# Patient Record
Sex: MALE | Race: WHITE | NOT HISPANIC OR LATINO | Employment: FULL TIME | URBAN - METROPOLITAN AREA
[De-identification: names, ages, dates, MRNs, and addresses within clinical notes are randomized per-mention and may not be internally consistent; named-entity substitution may affect disease eponyms.]

---

## 2018-08-16 ENCOUNTER — OFFICE VISIT CONVERTED (OUTPATIENT)
Dept: SURGERY | Facility: CLINIC | Age: 43
End: 2018-08-16
Attending: SURGERY

## 2018-10-02 ENCOUNTER — OFFICE VISIT (OUTPATIENT)
Dept: SURGERY | Facility: CLINIC | Age: 43
End: 2018-10-02

## 2018-10-02 VITALS — OXYGEN SATURATION: 98 % | WEIGHT: 255 LBS | HEIGHT: 65 IN | HEART RATE: 83 BPM | BODY MASS INDEX: 42.49 KG/M2

## 2018-10-02 DIAGNOSIS — K42.9 UMBILICAL HERNIA WITHOUT OBSTRUCTION AND WITHOUT GANGRENE: Primary | ICD-10-CM

## 2018-10-02 PROCEDURE — 99243 OFF/OP CNSLTJ NEW/EST LOW 30: CPT | Performed by: SURGERY

## 2018-10-02 RX ORDER — CHLORAL HYDRATE 500 MG
CAPSULE ORAL
Refills: 0 | COMMUNITY
Start: 2018-08-13

## 2018-10-02 RX ORDER — ASPIRIN 81 MG
TABLET, DELAYED RELEASE (ENTERIC COATED) ORAL DAILY
Refills: 0 | COMMUNITY
Start: 2018-08-10 | End: 2022-11-05

## 2018-10-02 RX ORDER — LISINOPRIL AND HYDROCHLOROTHIAZIDE 25; 20 MG/1; MG/1
TABLET ORAL DAILY
Refills: 0 | COMMUNITY
Start: 2018-08-10 | End: 2021-07-16 | Stop reason: SDUPTHER

## 2018-10-02 NOTE — PROGRESS NOTES
Subjective   Robert Chau is a 42 y.o. male who presents to the office in surgical consultation from Paul Naylor MD for an umbilical hernia.    History of Present Illness     The patient recently noticed a bulge at the umbilicus that is slowly getting larger.  He does not have symptoms of pain unless pressure is applied.  He does a lot of heavy lifting and is concerned that he is at risk for incarceration or strangulation.  He has not had any change in his bowel or bladder function.    Review of Systems   Constitutional: Negative for activity change, appetite change, fatigue and fever.   HENT: Negative for trouble swallowing and voice change.    Respiratory: Negative for chest tightness and shortness of breath.    Cardiovascular: Negative for chest pain and palpitations.   Gastrointestinal: Negative for abdominal pain, blood in stool, constipation, diarrhea, nausea and vomiting.   Endocrine: Negative for cold intolerance and heat intolerance.   Genitourinary: Negative for dysuria and flank pain.   Neurological: Negative for dizziness and light-headedness.   Hematological: Negative for adenopathy. Does not bruise/bleed easily.   Psychiatric/Behavioral: Negative for agitation and confusion.     Past Medical History:   Diagnosis Date   • Hypertension      History reviewed. No pertinent surgical history.  Family History   Problem Relation Age of Onset   • Hypertension Maternal Grandmother      Social History     Social History   • Marital status: Single     Spouse name: N/A   • Number of children: N/A   • Years of education: N/A     Occupational History   • [employer]     Social History Main Topics   • Smoking status: Former Smoker   • Smokeless tobacco: Former User   • Alcohol use No   • Drug use: No   • Sexual activity: Defer     Other Topics Concern   • Not on file     Social History Narrative   • No narrative on file       Objective   Physical Exam   Constitutional: He is oriented  to person, place, and time. He appears well-developed and well-nourished.  Non-toxic appearance.   Eyes: EOM are normal. No scleral icterus.   Pulmonary/Chest: Effort normal. No respiratory distress.   Abdominal: Soft. Normal appearance. There is no tenderness. A hernia is present. Hernia confirmed positive in the ventral area (Reducible umbilical hernia that is mildly tender to palpation with a defect just over 1 cm in size).   Neurological: He is alert and oriented to person, place, and time.   Skin: Skin is warm and dry.   Psychiatric: He has a normal mood and affect. His behavior is normal. Judgment and thought content normal.       Assessment/Plan       The encounter diagnosis was Umbilical hernia without obstruction and without gangrene.    The patient has an umbilical hernia with a defect size that is almost large enough to permit small bowel.  He does very physical labor and could be at risk of developing an incarceration.  He has been scheduled for an umbilical hernia repair.  The patient understands the indications, alternatives, risks, and benefits of the procedure and wishes to proceed.

## 2018-12-11 ENCOUNTER — TELEPHONE (OUTPATIENT)
Dept: SURGERY | Facility: CLINIC | Age: 43
End: 2018-12-11

## 2019-09-17 ENCOUNTER — HOSPITAL ENCOUNTER (OUTPATIENT)
Dept: URGENT CARE | Facility: CLINIC | Age: 44
Discharge: HOME | End: 2019-09-17
Attending: NURSE PRACTITIONER

## 2020-05-14 ENCOUNTER — OFFICE VISIT CONVERTED (OUTPATIENT)
Dept: FAMILY MEDICINE CLINIC | Facility: CLINIC | Age: 45
End: 2020-05-14
Attending: FAMILY MEDICINE

## 2020-09-16 ENCOUNTER — HOSPITAL ENCOUNTER (OUTPATIENT)
Dept: GENERAL RADIOLOGY | Facility: HOSPITAL | Age: 45
Discharge: HOME | End: 2020-09-16
Attending: FAMILY MEDICINE

## 2020-09-16 ENCOUNTER — OFFICE VISIT CONVERTED (OUTPATIENT)
Dept: FAMILY MEDICINE CLINIC | Facility: CLINIC | Age: 45
End: 2020-09-16
Attending: FAMILY MEDICINE

## 2020-09-16 LAB
ALBUMIN SERPL-MCNC: 4.2 G/DL (ref 3.5–5)
ALBUMIN/GLOB SERPL: 1.4 {RATIO} (ref 1.4–2.6)
ALP SERPL-CCNC: 63 U/L (ref 53–128)
ALT SERPL-CCNC: 36 U/L (ref 10–40)
ANION GAP SERPL CALC-SCNC: 17 MMOL/L (ref 8–19)
AST SERPL-CCNC: 25 U/L (ref 15–50)
BASOPHILS # BLD AUTO: 0.03 10*3/UL (ref 0–0.2)
BASOPHILS NFR BLD AUTO: 0.4 % (ref 0–3)
BILIRUB SERPL-MCNC: 0.21 MG/DL (ref 0.2–1.3)
BUN SERPL-MCNC: 13 MG/DL (ref 5–25)
BUN/CREAT SERPL: 18 {RATIO} (ref 6–20)
CALCIUM SERPL-MCNC: 9.4 MG/DL (ref 8.7–10.4)
CHLORIDE SERPL-SCNC: 103 MMOL/L (ref 99–111)
CHOLEST SERPL-MCNC: 167 MG/DL (ref 107–200)
CHOLEST/HDLC SERPL: 4.1 {RATIO} (ref 3–6)
CONV ABS IMM GRAN: 0.02 10*3/UL (ref 0–0.2)
CONV CO2: 24 MMOL/L (ref 22–32)
CONV IMMATURE GRAN: 0.3 % (ref 0–1.8)
CONV TOTAL PROTEIN: 7.2 G/DL (ref 6.3–8.2)
CREAT UR-MCNC: 0.72 MG/DL (ref 0.7–1.2)
DEPRECATED RDW RBC AUTO: 38.8 FL (ref 35.1–43.9)
EOSINOPHIL # BLD AUTO: 0.14 10*3/UL (ref 0–0.7)
EOSINOPHIL # BLD AUTO: 1.8 % (ref 0–7)
ERYTHROCYTE [DISTWIDTH] IN BLOOD BY AUTOMATED COUNT: 13.2 % (ref 11.6–14.4)
GFR SERPLBLD BASED ON 1.73 SQ M-ARVRAT: >60 ML/MIN/{1.73_M2}
GLOBULIN UR ELPH-MCNC: 3 G/DL (ref 2–3.5)
GLUCOSE SERPL-MCNC: 104 MG/DL (ref 70–99)
HCT VFR BLD AUTO: 47.7 % (ref 42–52)
HDLC SERPL-MCNC: 41 MG/DL (ref 40–60)
HGB BLD-MCNC: 14.7 G/DL (ref 14–18)
LDLC SERPL CALC-MCNC: 100 MG/DL (ref 70–100)
LYMPHOCYTES # BLD AUTO: 1.47 10*3/UL (ref 1–5)
LYMPHOCYTES NFR BLD AUTO: 18.6 % (ref 20–45)
MCH RBC QN AUTO: 25 PG (ref 27–31)
MCHC RBC AUTO-ENTMCNC: 30.8 G/DL (ref 33–37)
MCV RBC AUTO: 81.1 FL (ref 80–96)
MONOCYTES # BLD AUTO: 0.66 10*3/UL (ref 0.2–1.2)
MONOCYTES NFR BLD AUTO: 8.4 % (ref 3–10)
NEUTROPHILS # BLD AUTO: 5.57 10*3/UL (ref 2–8)
NEUTROPHILS NFR BLD AUTO: 70.5 % (ref 30–85)
NRBC CBCN: 0 % (ref 0–0.7)
OSMOLALITY SERPL CALC.SUM OF ELEC: 290 MOSM/KG (ref 273–304)
PLATELET # BLD AUTO: 252 10*3/UL (ref 130–400)
PMV BLD AUTO: 12 FL (ref 9.4–12.4)
POTASSIUM SERPL-SCNC: 4.1 MMOL/L (ref 3.5–5.3)
RBC # BLD AUTO: 5.88 10*6/UL (ref 4.7–6.1)
SODIUM SERPL-SCNC: 140 MMOL/L (ref 135–147)
TRIGL SERPL-MCNC: 131 MG/DL (ref 40–150)
TSH SERPL-ACNC: 0.92 M[IU]/L (ref 0.27–4.2)
VLDLC SERPL-MCNC: 26 MG/DL (ref 5–37)
WBC # BLD AUTO: 7.89 10*3/UL (ref 4.8–10.8)

## 2020-10-06 ENCOUNTER — HOSPITAL ENCOUNTER (OUTPATIENT)
Dept: GENERAL RADIOLOGY | Facility: HOSPITAL | Age: 45
Discharge: HOME | End: 2020-10-06
Attending: FAMILY MEDICINE

## 2020-10-06 LAB
ANION GAP SERPL CALC-SCNC: 20 MMOL/L (ref 8–19)
BUN SERPL-MCNC: 14 MG/DL (ref 5–25)
BUN/CREAT SERPL: 20 {RATIO} (ref 6–20)
CALCIUM SERPL-MCNC: 9.8 MG/DL (ref 8.7–10.4)
CHLORIDE SERPL-SCNC: 97 MMOL/L (ref 99–111)
CONV CO2: 24 MMOL/L (ref 22–32)
CREAT UR-MCNC: 0.71 MG/DL (ref 0.7–1.2)
GFR SERPLBLD BASED ON 1.73 SQ M-ARVRAT: >60 ML/MIN/{1.73_M2}
GLUCOSE SERPL-MCNC: 105 MG/DL (ref 70–99)
OSMOLALITY SERPL CALC.SUM OF ELEC: 283 MOSM/KG (ref 273–304)
POTASSIUM SERPL-SCNC: 4.6 MMOL/L (ref 3.5–5.3)
SODIUM SERPL-SCNC: 136 MMOL/L (ref 135–147)

## 2020-10-16 ENCOUNTER — OFFICE VISIT CONVERTED (OUTPATIENT)
Dept: FAMILY MEDICINE CLINIC | Facility: CLINIC | Age: 45
End: 2020-10-16
Attending: FAMILY MEDICINE

## 2020-10-16 ENCOUNTER — CONVERSION ENCOUNTER (OUTPATIENT)
Dept: FAMILY MEDICINE CLINIC | Facility: CLINIC | Age: 45
End: 2020-10-16

## 2021-03-04 ENCOUNTER — CONVERSION ENCOUNTER (OUTPATIENT)
Dept: OTHER | Facility: HOSPITAL | Age: 46
End: 2021-03-04

## 2021-04-16 ENCOUNTER — TELEMEDICINE CONVERTED (OUTPATIENT)
Dept: FAMILY MEDICINE CLINIC | Facility: CLINIC | Age: 46
End: 2021-04-16
Attending: FAMILY MEDICINE

## 2021-04-16 ENCOUNTER — CONVERSION ENCOUNTER (OUTPATIENT)
Dept: FAMILY MEDICINE CLINIC | Facility: CLINIC | Age: 46
End: 2021-04-16

## 2021-05-13 NOTE — PROGRESS NOTES
Progress Note      Patient Name: Dinh Chau   Patient ID: [id number]   Sex: Male   YOB: 1975    Primary Care Provider: Stefanie Xiao DO   Referring Provider: Stefanie Xiao DO    Visit Date: September 16, 2020    Provider: Stefanie Xiao DO   Location: [facility]   Location Address: [address] [place] [address] [address]  [place], [place]  [id number]   Location Phone: [phone]          Chief Complaint  · 4 mo follow up       History Of Present Illness  Dinh Chau is a 44 year old /White male who presents for evaluation and treatment of:      He is here today to for the management of his chronic medical conditions.  He is engaged and has two children. He worsk for [employer]. He has a FH of OA.     He is a prior smoker. He has a PMH significant for tobacco abuse, HTN, prediabetes, HLD, obesity and anxiety.     He says that his blood pressure is running 140/90s at home.     He is having chest pain pain that comes and goes. He says that it is more of a sore feeling. He says that taking a deep breath makes the pain worse. He says that it has been present for about two weeks. He has had a stress test 3 years ago that was benign.    The patient has gained weight. He admits to overeating.    He has no other complaints today and denies shortness of breath, weakness, numbness, nausea, vomiting, diarrhea, dizziness or syncopal event.            Past Medical History  Disease Name Date Onset Notes   Hernia --  --    High blood pressure --  --    High cholesterol --  --          Medication List  Name Date Started Instructions   buspirone 10 mg oral tablet  take 1 tablet (10 mg) by oral route 2 times per day   pravastatin 20 mg oral tablet  take 1 tablet (20 mg) by oral route once daily         Allergy List  Allergen Name Date Reaction Notes   NO KNOWN DRUG ALLERGIES --  --  --          Family Medical History  Disease Name Relative/Age Notes   *No Known  "Family History  --          Social History  Finding Status Start/Stop Quantity Notes   Active but no formal exercise --  --/-- --  --    Alcohol Never --/-- --  05/14/2020 - drinks no    --  --/-- --  05/14/2020 - [employer]    Caffeine Current some day --/-- --  drinks occasionally; coffee; 1-2 times per day   Denies illicit substance abuse Never --/-- --  05/14/2020 -    Denies substance abuse Never --/-- --  05/14/2020 -    Tobacco Former [age] --  former smoker; started smoking at age 17; quit smoking at age 36         Review of Systems  · Constitutional  o Denies  o : fever, fatigue, weight loss, weight gain  · Cardiovascular  o Denies  o : lower extremity edema, claudication, chest pressure, palpitations  · Respiratory  o Denies  o : shortness of breath, wheezing, cough, hemoptysis, dyspnea on exertion  · Gastrointestinal  o Denies  o : nausea, vomiting, diarrhea, constipation, abdominal pain  · Neurologic  o Denies  o : tingling or numbness  · Musculoskeletal  o Denies  o : muscular weakness      Vitals  Date Time BP Position Site L\R Cuff Size HR RR TEMP (F) WT  HT  BMI kg/m2 BSA m2 O2 Sat        09/16/2020 08:27 /78 Sitting    71 - R 18 97.5 267lbs 0oz 5'  5.5\" 43.75 2.37 99 %          Physical Examination  · Constitutional  o Appearance  o : morbidly obese, well developed, alert, no obvious deformities present, NAD  · Head and Face  o Head  o :   § Inspection  § : atraumatic, normocephalic  · Ears, Nose, Mouth and Throat  o Ears  o :   § External Ears  § : normal  o Nose  o :   § Intranasal Exam  § : nares patent  o Oral Cavity  o :   § Oral Mucosa  § : moist mucous membranes  · Respiratory  o Respiratory Effort  o : breathing comfortably, symmetric chest rise  o Auscultation of Lungs  o : clear to asculatation bilaterally, no wheezes, rales, or rhonchii  · Cardiovascular  o Heart  o :   § Auscultation of Heart  § : regular rate and rhythm, no murmurs, rubs, or " gallops  o Peripheral Vascular System  o :   § Extremities  § : no edema  · Neurologic  o Mental Status Examination  o :   § Orientation  § : grossly oriented to person, place and time  o Cranial Nerves  o : cranial nerves intact bilaterally  o Gait and Station  o :   § Gait Screening  § : normal gait  · Psychiatric  o General  o : normal mood and affect              Assessment  · Essential hypertension     401.9/I10  will change lisinopril to lisinopril/HCTZ 20/12.5mg. Will reassess BP in 1 month. Educated patient on importance of weight loss and low salt diet. PAtient will continue to check his BP at home.  · Hyperlipidemia     272.4/E78.5  will check lipid panel, discussed low fat diet. Continue pravastatin for now  · Adult BMI 40.0-44.9 kg/sq m     V85.41/Z68.41  discussed dietary changes and weight loss       Plan  · Orders  o BMP [facility] ([id number]) - 401.9/I10 - 10/16/2020  o Physical, Primary Care Panel (CBC, CMP, Lipid, TSH) [facility] ([id number], [id number], 66307, 40502) - 272.4/E78.5, 401.9/I10 - 09/16/2020  o ACO-39: Current medications updated and reviewed () - - 09/16/2020  o ACO-14: Influenza immunization administered or previously received () - - 09/16/2020  · Medications  o lisinopril-hydrochlorothiazide 20-12.5 mg oral tablet   SIG: take 1 tablet by oral route once daily for 30 days   DISP: (30) tablets with 1 refills  Prescribed on 09/16/2020     o lisinopril 20 mg oral tablet   SIG: take 1 tablet (20 mg) by oral route once daily   DISP: (0) tablet with 0 refills  Discontinued on 09/16/2020     o Medications have been Reconciled  o Transition of Care or Provider Policy  · Instructions  o Advised that cheeses and other sources of dairy fats, animal fats, fast food, and the extras (candy, pastries, pies, doughnuts and cookies) all contain LDL raising nutrients. Advised to increase fruits, vegetables, whole grains, and to monitor portion sizes.   o Patient was educated/instructed on their diagnosis, treatment and  medications prior to discharge from the clinic today.  · Disposition  o Return Visit Request in/on 1 month +/- 2 days ([id number]).            Electronically Signed by: Stefanie Xiao DO - on September 16, 2020 09:39:32 AM

## 2021-05-13 NOTE — PROGRESS NOTES
Progress Note      Patient Name: Dinh Chau   Patient ID: [id number]   Sex: Male   YOB: 1975    Primary Care Provider: Stefanie Xiao DO   Referring Provider: Stefanie Xiao DO    Visit Date: May 14, 2020    Provider: Stefanie Xiao DO   Location: [facility]   Location Address: [address] [place] [address] [address] [place] [address]  [id number]   Location Phone: [phone]          Chief Complaint  · Establish Care  · Chest Pain 2 weeks ago and comes and goes      History Of Present Illness  Dinh Chau is a 44 year old /White male who presents for evaluation and treatment of:      He is here today to establish relations as a new patient. His previous PCP is Dr Rachel. He is engaged and has two children. He has a FH of OA.     He is a prior smoker. He has a PMH significant for tobacco abuse, HTN, prediabetes, HLD, obesity and anxiety.     He says that his blood pressure is running 130-140/85 at home.     He is having chest pain pain that comes and goes. He says that it is more of a sore feeling. He says that taking a deep breath makes the pain worse. He says that it has been present for about two weeks. He has had a stress test 3 years ago that was benign.    The patient is dieting and trying to lose weight.  He has lost 10 pounds in the past 2 months.    He has no other complaints today and denies shortness of breath, weakness, numbness, nausea, vomiting, diarrhea, dizziness or syncopal event.       Medication List  Name Date Started Instructions   aspirin 81 mg oral tablet,delayed release (DR/EC)  take 1 tablet (81 mg) by oral route once daily   buspirone 10 mg oral tablet  take 1 tablet (10 mg) by oral route 2 times per day   lisinopril 20 mg oral tablet  take 1 tablet (20 mg) by oral route once daily   pravastatin 20 mg oral tablet  take 1 tablet (20 mg) by oral route once daily         Allergy List  Allergen Name Date Reaction Notes   NO KNOWN DRUG  "ALLERGIES --  --  --        Allergies Reconciled  Social History  Finding Status Start/Stop Quantity Notes   Active but no formal exercise --  --/-- --  --    Alcohol Never --/-- --  05/14/2020 - drinks no    --  --/-- --  05/14/2020 - [employer]    Caffeine Current some day --/-- --  drinks occasionally; coffee; 1-2 times per day   Denies illicit substance abuse Never --/-- --  05/14/2020 -    Denies substance abuse Never --/-- --  05/14/2020 -    Tobacco Former 17/35 --  former smoker; started smoking at age 17; quit smoking at age 36         Review of Systems  · Constitutional  o Denies  o : fever, fatigue, weight loss, weight gain  · HENT  o Denies  o : headaches  · Cardiovascular  o Admits  o : Chest pain  o Denies  o : pedal edema, claudication, chest pressure, palpitations  · Respiratory  o Denies  o : shortness of breath, wheezing, cough, hemoptysis, dyspnea on exertion  · Gastrointestinal  o Denies  o : nausea, vomiting, diarrhea, constipation, abdominal pain  · Genitourinary  o Denies  o : urgency, frequency  · Integument  o Denies  o : rash  · Neurologic  o Denies  o : altered mental status, muscular weakness  · Endocrine  o Denies  o : polyuria, polydipsia  · Psychiatric  o Denies  o : anxiety, depression, suicidal ideation      Vitals  Date Time BP Position Site L\R Cuff Size HR RR TEMP (F) WT  HT  BMI kg/m2 BSA m2 O2 Sat        05/14/2020 03:34 /87 Sitting    92 - R 20 98.2 254lbs 6oz 5'  5.5\" 41.69 2.31 98 %          Physical Examination  · Constitutional  o Appearance  o : morbidly obese, well developed, alert, NAD  · Head and Face  o Head  o :   § Inspection  § : atraumatic, normocephalic  · Eyes  o Eyes  o : extraocular movements intact, no scleral icterus, no conjunctival injection  · Ears, Nose, Mouth and Throat  o Nose  o :   § Intranasal Exam  § : nares patent  o Oral Cavity  o :   § Oral Mucosa  § : moist mucous membranes  · Respiratory  o Respiratory  o : breathing " comfortably, symmetric chest rise, clear to asculatation bilaterally, no wheezes, rales, or rhonchii  · Cardiovascular  o Heart  o :   § Auscultation of Heart  § : regular rate and rhythm, no murmurs, rubs, or gallops  o Peripheral Vascular System  o :   § Extremities  § : no edema  · Skin and Subcutaneous Tissue  o General Inspection  o : no rashes present, no lesions present, no areas of discoloration, skin turgor normal  · Neurologic  o Cranial Nerves  o : crainial nerves 2-12 grossly intact  o Gait and Station  o :   § Gait Screening  § : normal gait  · Psychiatric  o General  o : normal mood and affect     Reproducible chest wall pain with palpation over the upper right chest.       Figure 1.0: Pain Rating Scale-Elmo         Assessment  · Chest pain     786.50/R07.9  · Essential hypertension     401.9/I10  · Hyperlipidemia     272.4/E78.5  · Screening for depression     V79.0/Z13.89  · Establishing care with new doctor, encounter for     V65.8/Z76.89  · Chest wall pain     786.52/R07.89  · Prediabetes     790.29/R73.03  · Adult BMI 40.0-44.9 kg/sq m     V85.41/Z68.41      Plan  · Orders  o ACO-18: Negative screen for clinical depression using a standardized tool () - V79.0/Z13.89 - 05/14/2020  o Physical, Primary Care Panel (CBC, CMP, Lipid, TSH) [facility] ([id number], 94128, 48994, 93179) - - 05/14/2020  o ACO-39: Current medications updated and reviewed () - - 05/14/2020  o ACO-18: Negative screen for clinical depression using a standardized tool () - - 05/14/2020  o EKG (Recording and Interpretation) [facility] (Done and read at [facility]) ([id number]) - 786.50/R07.9, 786.52/R07.89 - 05/18/2020  · Medications  o Medications have been Reconciled  o Transition of Care or Provider Policy  · Instructions  o Advised that cheeses and other sources of dairy fats, animal fats, fast food, and the extras (candy, pastries, pies, doughnuts and cookies) all contain LDL raising nutrients. Advised to increase fruits, vegetables, whole  grains, and to monitor portion sizes.   o Depression Screen completed and scanned into the EMR under the designated folder within the patient's documents.  o Patient was educated/instructed on their diagnosis, treatment and medications prior to discharge from the clinic today.  · Disposition  o Return Visit Request in/on 4 months +/- 2 days ([id number]).     1.  Chest pain/chest wall pain: EKG in the office today was revealing for normal sinus rhythm with a rate of 81.  Normal ND interval.  No sign of acute ST elevation or depression was appreciated.  Patient did recently have a stress echo done in 2017 that was benign for any signs of ischemia.  The patient was told if his symptoms change or worsen to go to the ER.  2.  Obesity: Diet, weight loss and exercise were highly encouraged today's visit.  3.  Hypertension: The patient's blood pressure is running little bit high today in the clinic.  He was encouraged to continue checking his blood pressures at home and keep a log to bring back to his next appointment.  4.  Hyperlipidemia: The patient was given lab order today for a lipid profile to be done in 3 months.  He will be continued on pravastatin 20 mg daily until that time.  Follow-up in 4 months.             Electronically Signed by: Stefanie Xiao DO -Author on May 18, 2020 12:51:27 PM

## 2021-05-13 NOTE — PROGRESS NOTES
Progress Note      Patient Name: Dinh Chau   Patient ID: [id number]   Sex: Male   YOB: 1975    Primary Care Provider: Stefanie Xiao DO   Referring Provider: Stefanie Xiao DO    Visit Date: October 16, 2020    Provider: Stefanie Xiao DO   Location: [facility]   Location Address: [address] [place] [address]  [id number]   Location Phone: [phone]          Chief Complaint  · 1mo follow up       History Of Present Illness  Dinh Chau is a 44 year old /White male who presents for evaluation and treatment of:      He is here today to for the management of his chronic medical conditions.  He is engaged and has two children. He worsk for [employer]. He has a FH of OA.     He is a prior smoker. He has a PMH significant for tobacco abuse, HTN, prediabetes, HLD, obesity and anxiety.     Labs 10/6/2020: Creatinine 0.71, BUN 20, GFR greater than 60, potassium 4.6.    Labs 9/16/2020: Glucose 104, creatinine 0.72, GFR greater than 60, potassium 4.1, liver enzymes within normal limits, platelets 252, hemoglobin 14.7, MCV 81.1, TSH 0.924, triglyceride 131, HDL 41, .    He says that his blood pressure is running 140/90s at home.     He is having chest pain pain that comes and goes. He says that it is more of a sore feeling. He says that taking a deep breath makes the pain worse. He says that it has been present for about two weeks. He has had a stress test 3 years ago that was benign.    He has decreased his salt intake and stopped eating fast food. He has gained 1 lb since his last visit.     He is also complaining of having allergy issues/runny nose.    He says the buspar is not working like it was before/that it wears off.     He has no other complaints today and denies shortness of breath, weakness, numbness, nausea, vomiting, diarrhea, dizziness or syncopal event.              Past Medical History  Disease Name Date Onset Notes   Hernia  "--  --    High blood pressure --  --    High cholesterol --  --          Medication List  Name Date Started Instructions   buspirone 10 mg oral tablet 10/16/2020 take 1 tablet (10 mg) by oral route 3 times per day for 30 days   lisinopril-hydrochlorothiazide 20-12.5 mg oral tablet 10/16/2020 take 1 tablet by oral route once daily for 30 days   pravastatin 20 mg oral tablet 10/16/2020 take 1 tablet (20 mg) by oral route once daily for 30 days         Allergy List  Allergen Name Date Reaction Notes   NO KNOWN DRUG ALLERGIES --  --  --          Family Medical History  Disease Name Relative/Age Notes   *No Known Family History  --          Social History  Finding Status Start/Stop Quantity Notes   Active but no formal exercise --  --/-- --  --    Alcohol Never --/-- --  05/14/2020 - drinks no    --  --/-- --  05/14/2020 - [employer]    Caffeine Current some day --/-- --  drinks occasionally; coffee; 1-2 times per day   Denies illicit substance abuse Never --/-- --  05/14/2020 -    Denies substance abuse Never --/-- --  05/14/2020 -    Tobacco Former 16/35 --  former smoker; started smoking at age 17; quit smoking at age 36         Review of Systems  · Constitutional  o * See HPI  · HENT  o * See HPI  · Cardiovascular  o * See HPI  · Respiratory  o * See HPI  · Gastrointestinal  o * See HPI  · Neurologic  o * See HPI  · Musculoskeletal  o * See HPI  · Psychiatric  o * See HPI  · Allergic-Immunologic  o * See HPI      Vitals  Date Time BP Position Site L\R Cuff Size HR RR TEMP (F) WT  HT  BMI kg/m2 BSA m2 O2 Sat FR L/min FiO2 HC       10/16/2020 08:05 /88 Sitting    67 - R 18 97.7 268lbs 4oz 5'  5.5\" 43.96 2.37 97 %  21%          Physical Examination  · Constitutional  o Appearance  o : morbidly obese, well developed, alert, in no acute distress, no obvious deformities present, NAD  · Head and Face  o Head  o :   § Inspection  § : atraumatic, normocephalic  · Ears, Nose, Mouth and " Throat  o Ears  o :   § External Ears  § : normal  o Nose  o :   § Intranasal Exam  § : nares patent  o Oral Cavity  o :   § Oral Mucosa  § : moist mucous membranes  · Respiratory  o Respiratory Effort  o : breathing comfortably, symmetric chest rise  o Auscultation of Lungs  o : clear to asculatation bilaterally, no wheezes, rales, or rhonchii  · Cardiovascular  o Heart  o :   § Auscultation of Heart  § : regular rate and rhythm, no murmurs, rubs, or gallops  o Peripheral Vascular System  o :   § Extremities  § : no edema  · Neurologic  o Mental Status Examination  o :   § Orientation  § : grossly oriented to person, place and time  o Cranial Nerves  o : cranial nerves intact bilaterally  o Gait and Station  o :   § Gait Screening  § : normal gait  · Psychiatric  o General  o : normal mood and affect              Assessment  · Anxiety disorder     300.00/F41.9  will increase Buspar 10mg to tid vs bid  · Essential hypertension     401.9/I10  BP much improved, continue Lisinopril 20/HCTz 12.5 qday  He was encouraged to monitor his BP at home  · Seasonal allergies     477.9/J30.2  A prescription was given for cetirizine 10 mg PO qday  · Adult BMI 40.0-44.9 kg/sq m     V85.41/Z68.41  he was encouraged to continue to try to lose weight  decrease high fat and high sugar foods  start exercising as tolerated      Plan  · Orders  o ACO-39: Current medications updated and reviewed (1159F, ) - - 10/16/2020  o ACO-14: Influenza immunization was not administered for reasons documented [facility] () - - 10/16/2020  · Medications  o cetirizine 10 mg oral tablet   SIG: take 1 tablet (10 mg) by oral route once daily for 30 days   DISP: (30) Tablet with 5 refills  Prescribed on 10/16/2020     o buspirone 10 mg oral tablet   SIG: take 1 tablet (10 mg) by oral route 3 times per day for 30 days   DISP: (90) Tablet with 5 refills  Adjusted on 10/16/2020     o lisinopril-hydrochlorothiazide 20-12.5 mg oral tablet   SIG: take 1 tablet  by oral route once daily for 30 days   DISP: (30) Tablet with 5 refills  Refilled on 10/16/2020     o pravastatin 20 mg oral tablet   SIG: take 1 tablet (20 mg) by oral route once daily for 30 days   DISP: (30) Tablet with 5 refills  Refilled on 10/16/2020     o Medications have been Reconciled  o Transition of Care or Provider Policy  · Instructions  o Patient was educated/instructed on their diagnosis, treatment and medications prior to discharge from the clinic today.  · Disposition  o Follow up in 6 months            Electronically Signed by: Stefanie Xiao DO -Author on October 19, 2020 07:23:50 AM

## 2021-05-14 VITALS
HEIGHT: 65 IN | TEMPERATURE: 97.7 F | RESPIRATION RATE: 18 BRPM | HEART RATE: 67 BPM | BODY MASS INDEX: 44.69 KG/M2 | DIASTOLIC BLOOD PRESSURE: 88 MMHG | WEIGHT: 268.25 LBS | OXYGEN SATURATION: 97 % | SYSTOLIC BLOOD PRESSURE: 138 MMHG

## 2021-05-14 VITALS
DIASTOLIC BLOOD PRESSURE: 78 MMHG | OXYGEN SATURATION: 99 % | TEMPERATURE: 97.5 F | HEIGHT: 65 IN | WEIGHT: 267 LBS | HEART RATE: 71 BPM | BODY MASS INDEX: 44.48 KG/M2 | RESPIRATION RATE: 18 BRPM | SYSTOLIC BLOOD PRESSURE: 152 MMHG

## 2021-05-14 VITALS — WEIGHT: 270 LBS | HEIGHT: 65 IN | BODY MASS INDEX: 44.98 KG/M2

## 2021-05-14 VITALS
WEIGHT: 271.12 LBS | RESPIRATION RATE: 18 BRPM | BODY MASS INDEX: 45.17 KG/M2 | DIASTOLIC BLOOD PRESSURE: 68 MMHG | HEART RATE: 80 BPM | OXYGEN SATURATION: 97 % | SYSTOLIC BLOOD PRESSURE: 138 MMHG | TEMPERATURE: 97.4 F | HEIGHT: 65 IN

## 2021-05-14 NOTE — PROGRESS NOTES
Progress Note      Patient Name: Dinh Chau   Patient ID: [id number]   Sex: Male   YOB: 1975    Primary Care Provider: Stefanie Xiao DO   Referring Provider: Stefanie Xiao DO    Visit Date: April 16, 2021    Provider: Randy Mart DO   Location: [facility]   Location Address: [address] [place] [address]  [id number]   Location Phone: [phone]          Chief Complaint  · f/u chronic conditoins      History Of Present Illness  Dinh Chau is a 45 year old /White male who presents for evaluation and treatment of:   Video Conferencing Visit  Dinh Chau is a 45 year old /White male who is presenting for evaluation via video conferencing via [employer]. Verbal consent obtained before beginning visit.   The following staff were present during this visit: Randy Mart DO        Patient presents for refill on medications has history of HTN anxiety depression ED HLD takes lisinopril HCTZ BuSpar pravastatin tadalafil.  Last seen by PCP 10/thousand 20, blood pressures were fairly well controlled advised to manage weight and try to get be a monitor 51 last labs 2020 largely unremarkable other than mildly elevated glucose 105         Past Medical History  Disease Name Date Onset Notes   Hernia --  --    High blood pressure --  --    High cholesterol --  --          Medication List  Name Date Started Instructions   buspirone 10 mg oral tablet 10/16/2020 take 1 tablet (10 mg) by oral route 3 times per day for 30 days   lisinopril-hydrochlorothiazide 20-12.5 mg oral tablet 10/16/2020 take 1 tablet by oral route once daily for 30 days   pravastatin 20 mg oral tablet 10/16/2020 take 1 tablet (20 mg) by oral route once daily for 30 days   tadalafil 10 mg oral tablet 03/12/2021 take 1 tablet (10 mg) by oral route once daily for 30 days         Allergy List  Allergen Name Date Reaction Notes   NO KNOWN DRUG ALLERGIES --  --  --   "      Allergies Reconciled  Family Medical History  Disease Name Relative/Age Notes   *No Known Family History  --          Social History  Finding Status Start/Stop Quantity Notes   Active but no formal exercise --  --/-- --  --    Alcohol Never --/-- --  05/14/2020 - drinks no    --  --/-- --  05/14/2020 - [employer]    Caffeine Current some day --/-- --  drinks occasionally; coffee; 1-2 times per day   Denies illicit substance abuse Never --/-- --  05/14/2020 -    Denies substance abuse Never --/-- --  05/14/2020 -    Tobacco Former [age] --  former smoker; started smoking at age 17; quit smoking at age 36         Immunizations  NameDate Admin Mfg Trade Name Lot Number Route Inj VIS Given VIS Publication   [id number]/18/2020 PMC Fluzone [employer] [id number] IM RD 11/18/2020 08/15/2019   Comments: NDC#[id number],PATIENT TOLERATED WELL, NO REACTION.         Review of Systems  · Constitutional  o Denies  o : fever, fatigue, weight loss, weight gain  · HENT  o Denies  o : sinus pain, nasal congestion, nasal discharge, postnasal drip  · Cardiovascular  o Denies  o : lower extremity edema, claudication, chest pressure, palpitations  · Respiratory  o Denies  o : shortness of breath, wheezing, cough, hemoptysis, dyspnea on exertion  · Gastrointestinal  o Denies  o : nausea, vomiting, diarrhea, constipation, abdominal pain  · Integument  o Denies  o : rash, itching  · Musculoskeletal  o Denies  o : joint pain, joint swelling  · Psychiatric  o Denies  o : anxiety, depression      Vitals  Date Time BP Position Site L\R Cuff Size HR RR TEMP (F) WT  HT  BMI kg/m2 BSA m2 O2 Sat FR L/min FiO2        04/16/2021 01:32 PM         270lbs 0oz 5'  5\" 44.93 2.37             Physical Examination  · Constitutional  o Appearance  o : no acute distress, well-nourished  · Head and Face  o Head  o :   § Inspection  § : atraumatic, normocephalic  · Eyes  o Eyes  o : extraocular movements intact, no scleral icterus, no " conjunctival injection  · Ears, Nose, Mouth and Throat  o Ears  o :   § External Ears  § : normal  o Nose  o :   § Intranasal Exam  § : nares patent  o Oral Cavity  o :   § Oral Mucosa  § : moist mucous membranes  · Neurologic  o Mental Status Examination  o :   § Orientation  § : grossly oriented to person, place and time  o Gait and Station  o :   § Gait Screening  § : normal gait  · Psychiatric  o General  o : normal mood and affect          Assessment  · Obesity     278.00/E66.9  · HTN (hypertension)     401.9/I10  · HLD (hyperlipidemia)     272.4/E78.5  · KELLEY (generalized anxiety disorder)     300.02/F41.1         HTN  *Controlled  Continue with medicine lisinopril HCTZ  Goal is 140/90  Monitor and follow-up if not at goal recommend labs before follow-up in 6 months    HLD  Continue with pravastatin renewed today    Anxiety depression  *Controlled  continue with BuSpar  No SI HI or other    Obesity  Managed  Recommend calorie restriction weight loss increased exercise and activity to goal BMI at least approaching 30    Follow-up 6 months or sooner if concerns, labs ordered to be done prior to follow-up       Plan  · Orders  o Physical, Primary Care Panel (CBC, CMP, Lipid, TSH) [facility] ([id number], 83625, 92089, [id number]) - 401.9/I10, 272.4/E78.5, 300.02/F41.1, 278.00/E66.9 - 04/16/2021  o ACO-39: Current medications updated and reviewed (, 1159F) - 401.9/I10, 272.4/E78.5, 300.02/F41.1, 278.00/E66.9 - 04/16/2021  · Medications  o Medications have been Reconciled  · Instructions  o Handouts were given to patient:   o Patient is taking medications as prescribed and doing well.   o Take all medications as prescribed/directed.  o Patient was educated/instructed on their diagnosis, treatment and medications prior to discharge from the clinic today.  o Call the office with any concerns or questions.  o Bring all medicines with their bottles to each office visit.  o Risks, benefits, and alternatives were discussed with the  patient. The patient is aware of risks associated with:  o Chronic conditions reviewed and taken into consideration for today's treatment plan.  o Electronically Identified Patient Education Materials Provided Electronically  · Disposition  o Call or Return if symptoms worsen or persist.  o Labs before follow up ordered  o Reviewed chart labs and imaging prior to and during encounter, updated  o Return Visit Request in/on 6 months +/- 7 days ([id number]).            Electronically Signed by: Randy Mart DO -Author on April 16, 2021 02:31:24 PM

## 2021-05-15 VITALS
HEART RATE: 92 BPM | SYSTOLIC BLOOD PRESSURE: 147 MMHG | WEIGHT: 254.37 LBS | TEMPERATURE: 98.2 F | DIASTOLIC BLOOD PRESSURE: 87 MMHG | OXYGEN SATURATION: 98 % | BODY MASS INDEX: 42.38 KG/M2 | HEIGHT: 65 IN | RESPIRATION RATE: 20 BRPM

## 2021-05-16 VITALS — BODY MASS INDEX: 40.98 KG/M2 | RESPIRATION RATE: 14 BRPM | HEIGHT: 66 IN | WEIGHT: 255 LBS

## 2021-07-16 ENCOUNTER — OFFICE VISIT (OUTPATIENT)
Dept: FAMILY MEDICINE CLINIC | Facility: CLINIC | Age: 46
End: 2021-07-16

## 2021-07-16 VITALS
HEART RATE: 73 BPM | HEIGHT: 65 IN | OXYGEN SATURATION: 98 % | RESPIRATION RATE: 18 BRPM | BODY MASS INDEX: 41.38 KG/M2 | SYSTOLIC BLOOD PRESSURE: 144 MMHG | TEMPERATURE: 97.8 F | DIASTOLIC BLOOD PRESSURE: 87 MMHG | WEIGHT: 248.4 LBS

## 2021-07-16 DIAGNOSIS — E66.1 CLASS 3 DRUG-INDUCED OBESITY WITHOUT SERIOUS COMORBIDITY WITH BODY MASS INDEX (BMI) OF 40.0 TO 44.9 IN ADULT (HCC): Primary | ICD-10-CM

## 2021-07-16 DIAGNOSIS — F41.1 GENERALIZED ANXIETY DISORDER: ICD-10-CM

## 2021-07-16 DIAGNOSIS — R73.9 HYPERGLYCEMIA: ICD-10-CM

## 2021-07-16 DIAGNOSIS — Z23 ENCOUNTER FOR VACCINATION: ICD-10-CM

## 2021-07-16 DIAGNOSIS — Z12.5 SCREENING FOR PROSTATE CANCER: ICD-10-CM

## 2021-07-16 DIAGNOSIS — I10 ESSENTIAL HYPERTENSION: ICD-10-CM

## 2021-07-16 DIAGNOSIS — Z00.00 ANNUAL PHYSICAL EXAM: ICD-10-CM

## 2021-07-16 DIAGNOSIS — F32.A DEPRESSION, UNSPECIFIED DEPRESSION TYPE: ICD-10-CM

## 2021-07-16 DIAGNOSIS — E78.2 MIXED HYPERLIPIDEMIA: ICD-10-CM

## 2021-07-16 PROBLEM — E66.813 CLASS 3 DRUG-INDUCED OBESITY WITHOUT SERIOUS COMORBIDITY WITH BODY MASS INDEX (BMI) OF 40.0 TO 44.9 IN ADULT: Status: ACTIVE | Noted: 2021-07-16

## 2021-07-16 PROBLEM — F33.9 RECURRENT MAJOR DEPRESSIVE DISORDER: Status: ACTIVE | Noted: 2021-07-16

## 2021-07-16 PROBLEM — E78.5 HYPERLIPIDEMIA: Status: ACTIVE | Noted: 2021-07-16

## 2021-07-16 PROCEDURE — 99214 OFFICE O/P EST MOD 30 MIN: CPT | Performed by: FAMILY MEDICINE

## 2021-07-16 PROCEDURE — 90715 TDAP VACCINE 7 YRS/> IM: CPT | Performed by: FAMILY MEDICINE

## 2021-07-16 PROCEDURE — 90471 IMMUNIZATION ADMIN: CPT | Performed by: FAMILY MEDICINE

## 2021-07-16 RX ORDER — LISINOPRIL AND HYDROCHLOROTHIAZIDE 20; 12.5 MG/1; MG/1
TABLET ORAL
COMMUNITY
Start: 2021-04-16

## 2021-07-16 RX ORDER — HYDROXYZINE PAMOATE 25 MG/1
CAPSULE ORAL
COMMUNITY
Start: 2021-07-09

## 2021-07-16 RX ORDER — PRAVASTATIN SODIUM 20 MG
TABLET ORAL
COMMUNITY
Start: 2021-04-16

## 2021-07-16 RX ORDER — BUSPIRONE HYDROCHLORIDE 10 MG/1
TABLET ORAL
COMMUNITY
Start: 2021-04-16

## 2021-07-16 RX ORDER — FLUOXETINE HYDROCHLORIDE 20 MG/1
CAPSULE ORAL
COMMUNITY
Start: 2021-07-09

## 2021-07-16 NOTE — ASSESSMENT & PLAN NOTE
He was encouarged to consider therapy but said he was too busy. He will be continued on Buspar 10 mg tid. He was given a prescription for vistaril and Pozac by an outside doctor. He was told to take as directed.

## 2021-07-16 NOTE — PROGRESS NOTES
"Chief Complaint  Anxiety and Back Pain (upper left back pain )    Subjective          Dinh Chau presents to [facility]  He is here today for the management of his chronic medical conditions.  He is engaged and has two children. He works for [employer]. He has a FH of OA.    He has lost 20 pounds since his last visit. He has been feeling down recently. He recently  from his fiancee. One week ago he went to a work doctor and was placed on Prozac 20 mg daily and Vistaril 25 prn for anxiety. He says that they have not helped much yet. He denies SI.     The patient has no other complaints today and denies chest pain, shortness of breath, weakness, numbness, nausea, vomiting, diarrhea, dizziness or syncopal event.        Objective   Vital Signs:   /87 (BP Location: Left arm, Patient Position: Sitting)   Pulse 73   Temp 97.8 °F (36.6 °C)   Resp 18   Ht 165.1 cm (65\")   Wt 113 kg (248 lb 6.4 oz)   SpO2 98%   BMI 41.34 kg/m²     Physical Exam  Vitals reviewed.   Constitutional:       Appearance: Normal appearance. He is well-developed. He is obese.   HENT:      Head: Normocephalic and atraumatic.      Right Ear: External ear normal.      Left Ear: External ear normal.      Mouth/Throat:      Pharynx: No oropharyngeal exudate.   Eyes:      Conjunctiva/sclera: Conjunctivae normal.      Pupils: Pupils are equal, round, and reactive to light.   Neck:      Vascular: No carotid bruit.   Cardiovascular:      Rate and Rhythm: Normal rate and regular rhythm.      Heart sounds: No murmur heard.   No friction rub. No gallop.    Pulmonary:      Effort: Pulmonary effort is normal.      Breath sounds: Normal breath sounds. No wheezing or rhonchi.   Abdominal:      General: Bowel sounds are normal. There is no distension.      Palpations: Abdomen is soft.      Tenderness: There is no abdominal tenderness.   Skin:     General: Skin is warm and dry.   Neurological:      " Mental Status: He is alert and oriented to person, place, and time.      Cranial Nerves: No cranial nerve deficit.      Motor: No weakness.   Psychiatric:         Mood and Affect: Mood and affect normal.         Behavior: Behavior normal.         Thought Content: Thought content normal.         Judgment: Judgment normal.        Result Review :     CMP    CMP 9/16/20 10/6/20   Glucose 104 (A) 105 (A)   BUN 13 14   Creatinine 0.72 0.71   Sodium 140 136   Potassium 4.1 4.6   Chloride 103 97 (A)   Calcium 9.4 9.8   Albumin 4.2    Total Bilirubin 0.21    Alkaline Phosphatase 63    AST (SGOT) 25    ALT (SGPT) 36    (A) Abnormal value            CBC    CBC 9/16/20   WBC 7.89   RBC 5.88   Hemoglobin 14.7   Hematocrit 47.7   MCV 81.1   MCH 25.0 (A)   MCHC 30.8 (A)   RDW 13.2   Platelets 252   (A) Abnormal value            Lipid Panel    Lipid Panel 9/16/20   Total Cholesterol 167   Triglycerides 131   HDL Cholesterol 41   VLDL Cholesterol 26   LDL Cholesterol  100      Comments are available for some flowsheets but are not being displayed.           TSH    TSH 9/16/20   TSH 0.924                     Assessment and Plan    Diagnoses and all orders for this visit:    1. Class 3 drug-induced obesity without serious comorbidity with body mass index (BMI) of 40.0 to 44.9 in adult (CMS/[facility]) (Primary)  Assessment & Plan:  He has lost 20 lbs since his last visit. Diet, exercise and wt. Loss was encouraged to day.       2. Mixed hyperlipidemia  Comments:  A lipid profile was ordered today. He was encouraged to continue dieting and lose wt.   Orders:  -     Lipid Panel; Future    3. Encounter for vaccination  -     Tdap Vaccine Greater Than or Equal To 6yo IM    4. Essential hypertension  Comments:  His blood pressure is elevated today. He was encouraged to continue wt. loss and take his lisinopril/HCTZ 20/12.5 mg daily as prescribed.     5. Hyperglycemia  -     Comprehensive Metabolic Panel; Future    6. Screening for prostate  cancer  -     PSA Screen; Future    7. Annual physical exam  -     CBC & Differential; Future  -     TSH; Future    8. Depression, unspecified depression type    9. Generalized anxiety disorder  Assessment & Plan:  He was encouarged to consider therapy but said he was too busy. He will be continued on Buspar 10 mg tid. He was given a prescription for vistaril and Pozac by an outside doctor. He was told to take as directed.         Follow Up   Return in about 3 months (around 10/16/2021).  Patient was given instructions and counseling regarding his condition or for health maintenance advice. Please see specific information pulled into the AVS if appropriate.

## 2021-07-29 ENCOUNTER — LAB (OUTPATIENT)
Dept: LAB | Facility: HOSPITAL | Age: 46
End: 2021-07-29

## 2021-07-29 DIAGNOSIS — R73.9 HYPERGLYCEMIA: ICD-10-CM

## 2021-07-29 DIAGNOSIS — R71.8 LOW MEAN CORPUSCULAR VOLUME (MCV): ICD-10-CM

## 2021-07-29 DIAGNOSIS — R71.8 LOW MEAN CORPUSCULAR VOLUME (MCV): Primary | ICD-10-CM

## 2021-07-29 DIAGNOSIS — E78.2 MIXED HYPERLIPIDEMIA: ICD-10-CM

## 2021-07-29 DIAGNOSIS — Z00.00 ANNUAL PHYSICAL EXAM: ICD-10-CM

## 2021-07-29 DIAGNOSIS — Z12.5 SCREENING FOR PROSTATE CANCER: ICD-10-CM

## 2021-07-29 LAB
ALBUMIN SERPL-MCNC: 4.7 G/DL (ref 3.5–5.2)
ALBUMIN/GLOB SERPL: 1.8 G/DL
ALP SERPL-CCNC: 67 U/L (ref 39–117)
ALT SERPL W P-5'-P-CCNC: 59 U/L (ref 1–41)
ANION GAP SERPL CALCULATED.3IONS-SCNC: 11 MMOL/L (ref 5–15)
AST SERPL-CCNC: 38 U/L (ref 1–40)
BASOPHILS # BLD AUTO: 0.02 10*3/MM3 (ref 0–0.2)
BASOPHILS NFR BLD AUTO: 0.3 % (ref 0–1.5)
BILIRUB SERPL-MCNC: 0.4 MG/DL (ref 0–1.2)
BUN SERPL-MCNC: 8 MG/DL (ref 6–20)
BUN/CREAT SERPL: 11.4 (ref 7–25)
CALCIUM SPEC-SCNC: 9.4 MG/DL (ref 8.6–10.5)
CHLORIDE SERPL-SCNC: 101 MMOL/L (ref 98–107)
CHOLEST SERPL-MCNC: 169 MG/DL (ref 0–200)
CO2 SERPL-SCNC: 25 MMOL/L (ref 22–29)
CREAT SERPL-MCNC: 0.7 MG/DL (ref 0.76–1.27)
DEPRECATED RDW RBC AUTO: 37 FL (ref 37–54)
EOSINOPHIL # BLD AUTO: 0.07 10*3/MM3 (ref 0–0.4)
EOSINOPHIL NFR BLD AUTO: 1.1 % (ref 0.3–6.2)
ERYTHROCYTE [DISTWIDTH] IN BLOOD BY AUTOMATED COUNT: 13.6 % (ref 12.3–15.4)
FERRITIN SERPL-MCNC: 358 NG/ML (ref 30–400)
GFR SERPL CREATININE-BSD FRML MDRD: 123 ML/MIN/1.73
GLOBULIN UR ELPH-MCNC: 2.6 GM/DL
GLUCOSE SERPL-MCNC: 97 MG/DL (ref 65–99)
HCT VFR BLD AUTO: 48 % (ref 37.5–51)
HDLC SERPL-MCNC: 41 MG/DL (ref 40–60)
HGB BLD-MCNC: 15.4 G/DL (ref 13–17.7)
IMM GRANULOCYTES # BLD AUTO: 0.01 10*3/MM3 (ref 0–0.05)
IMM GRANULOCYTES NFR BLD AUTO: 0.2 % (ref 0–0.5)
IRON 24H UR-MRATE: 119 MCG/DL (ref 59–158)
IRON SATN MFR SERPL: 27 % (ref 20–50)
LDLC SERPL CALC-MCNC: 109 MG/DL (ref 0–100)
LDLC/HDLC SERPL: 2.61 {RATIO}
LYMPHOCYTES # BLD AUTO: 1.19 10*3/MM3 (ref 0.7–3.1)
LYMPHOCYTES NFR BLD AUTO: 18.1 % (ref 19.6–45.3)
MCH RBC QN AUTO: 24.8 PG (ref 26.6–33)
MCHC RBC AUTO-ENTMCNC: 32.1 G/DL (ref 31.5–35.7)
MCV RBC AUTO: 77.3 FL (ref 79–97)
MONOCYTES # BLD AUTO: 0.65 10*3/MM3 (ref 0.1–0.9)
MONOCYTES NFR BLD AUTO: 9.9 % (ref 5–12)
NEUTROPHILS NFR BLD AUTO: 4.62 10*3/MM3 (ref 1.7–7)
NEUTROPHILS NFR BLD AUTO: 70.4 % (ref 42.7–76)
NRBC BLD AUTO-RTO: 0 /100 WBC (ref 0–0.2)
PLATELET # BLD AUTO: 285 10*3/MM3 (ref 140–450)
PMV BLD AUTO: 11.7 FL (ref 6–12)
POTASSIUM SERPL-SCNC: 4.1 MMOL/L (ref 3.5–5.2)
PROT SERPL-MCNC: 7.3 G/DL (ref 6–8.5)
PSA SERPL-MCNC: 1.02 NG/ML (ref 0–4)
RBC # BLD AUTO: 6.21 10*6/MM3 (ref 4.14–5.8)
SODIUM SERPL-SCNC: 137 MMOL/L (ref 136–145)
TIBC SERPL-MCNC: 440 MCG/DL (ref 298–536)
TRANSFERRIN SERPL-MCNC: 295 MG/DL (ref 200–360)
TRIGL SERPL-MCNC: 104 MG/DL (ref 0–150)
TSH SERPL DL<=0.05 MIU/L-ACNC: 0.74 UIU/ML (ref 0.27–4.2)
VLDLC SERPL-MCNC: 19 MG/DL (ref 5–40)
WBC # BLD AUTO: 6.56 10*3/MM3 (ref 3.4–10.8)

## 2021-07-29 PROCEDURE — 85025 COMPLETE CBC W/AUTO DIFF WBC: CPT

## 2021-07-29 PROCEDURE — 82728 ASSAY OF FERRITIN: CPT

## 2021-07-29 PROCEDURE — G0103 PSA SCREENING: HCPCS

## 2021-07-29 PROCEDURE — 36415 COLL VENOUS BLD VENIPUNCTURE: CPT

## 2021-07-29 PROCEDURE — 84443 ASSAY THYROID STIM HORMONE: CPT

## 2021-07-29 PROCEDURE — 80061 LIPID PANEL: CPT

## 2021-07-29 PROCEDURE — 80053 COMPREHEN METABOLIC PANEL: CPT

## 2021-07-29 PROCEDURE — 84466 ASSAY OF TRANSFERRIN: CPT

## 2021-07-29 PROCEDURE — 83540 ASSAY OF IRON: CPT

## 2021-08-03 ENCOUNTER — TELEPHONE (OUTPATIENT)
Dept: FAMILY MEDICINE CLINIC | Facility: CLINIC | Age: 46
End: 2021-08-03

## 2021-08-03 NOTE — TELEPHONE ENCOUNTER
Patient was wanting to know if we could fill his Fluoxetine and Hydroxyzine instead of getting them filled from [facility]?? Please advise and we will call the patient.

## 2021-08-04 NOTE — TELEPHONE ENCOUNTER
I don't mind to refill these two medications for him. Please find out the dosages and frequency and we can take them over.

## 2021-12-30 ENCOUNTER — OFFICE VISIT (OUTPATIENT)
Dept: ORTHOPEDIC SURGERY | Facility: CLINIC | Age: 46
End: 2021-12-30

## 2021-12-30 VITALS — WEIGHT: 250 LBS | HEART RATE: 100 BPM | OXYGEN SATURATION: 97 % | BODY MASS INDEX: 41.65 KG/M2 | HEIGHT: 65 IN

## 2021-12-30 DIAGNOSIS — M65.352 TRIGGER LITTLE FINGER OF LEFT HAND: Primary | ICD-10-CM

## 2021-12-30 PROCEDURE — 20550 NJX 1 TENDON SHEATH/LIGAMENT: CPT | Performed by: ORTHOPAEDIC SURGERY

## 2021-12-30 PROCEDURE — 99203 OFFICE O/P NEW LOW 30 MIN: CPT | Performed by: ORTHOPAEDIC SURGERY

## 2021-12-30 RX ORDER — SILDENAFIL CITRATE 20 MG/1
TABLET ORAL
COMMUNITY
Start: 2021-11-23

## 2021-12-30 RX ADMIN — BUPIVACAINE HYDROCHLORIDE 1 ML: 2.5 INJECTION, SOLUTION INFILTRATION; PERINEURAL at 13:37

## 2021-12-30 RX ADMIN — TRIAMCINOLONE ACETONIDE 40 MG: 40 INJECTION, SUSPENSION INTRA-ARTICULAR; INTRAMUSCULAR at 13:37

## 2022-01-01 RX ORDER — TRIAMCINOLONE ACETONIDE 40 MG/ML
40 INJECTION, SUSPENSION INTRA-ARTICULAR; INTRAMUSCULAR
Status: COMPLETED | OUTPATIENT
Start: 2021-12-30 | End: 2021-12-30

## 2022-01-01 RX ORDER — BUPIVACAINE HYDROCHLORIDE 2.5 MG/ML
1 INJECTION, SOLUTION INFILTRATION; PERINEURAL
Status: COMPLETED | OUTPATIENT
Start: 2021-12-30 | End: 2021-12-30

## 2022-01-14 ENCOUNTER — HOSPITAL ENCOUNTER (EMERGENCY)
Facility: HOSPITAL | Age: 47
Discharge: HOME | End: 2022-01-14
Attending: EMERGENCY MEDICINE | Admitting: EMERGENCY MEDICINE

## 2022-01-14 ENCOUNTER — APPOINTMENT (OUTPATIENT)
Dept: GENERAL RADIOLOGY | Facility: HOSPITAL | Age: 47
End: 2022-01-14

## 2022-01-14 VITALS
DIASTOLIC BLOOD PRESSURE: 102 MMHG | OXYGEN SATURATION: 100 % | BODY MASS INDEX: 43.27 KG/M2 | HEIGHT: 65 IN | SYSTOLIC BLOOD PRESSURE: 168 MMHG | HEART RATE: 89 BPM | TEMPERATURE: 98 F | WEIGHT: 259.7 LBS | RESPIRATION RATE: 18 BRPM

## 2022-01-14 DIAGNOSIS — S61.212A LACERATION OF RIGHT MIDDLE FINGER WITHOUT FOREIGN BODY WITHOUT DAMAGE TO NAIL, INITIAL ENCOUNTER: Primary | ICD-10-CM

## 2022-01-14 PROCEDURE — 99282 EMERGENCY DEPT VISIT SF MDM: CPT

## 2022-01-14 PROCEDURE — 73130 X-RAY EXAM OF HAND: CPT

## 2022-01-14 RX ORDER — LIDOCAINE HYDROCHLORIDE 10 MG/ML
10 INJECTION, SOLUTION EPIDURAL; INFILTRATION; INTRACAUDAL; PERINEURAL ONCE
Status: COMPLETED | OUTPATIENT
Start: 2022-01-14 | End: 2022-01-14

## 2022-01-14 RX ORDER — CEPHALEXIN 500 MG/1
500 CAPSULE ORAL 2 TIMES DAILY
Qty: 14 CAPSULE | Refills: 0 | Status: SHIPPED | OUTPATIENT
Start: 2022-01-14 | End: 2022-01-21

## 2022-01-14 RX ADMIN — LIDOCAINE HYDROCHLORIDE 10 ML: 10 INJECTION, SOLUTION EPIDURAL; INFILTRATION; INTRACAUDAL; PERINEURAL at 18:19

## 2022-01-14 NOTE — DISCHARGE INSTRUCTIONS
Return or FU PCP for removal in 7 days. Watch for infection. Placed on ATB due to continuation of work, wearing gloves, predisposed to infection.

## 2022-01-15 NOTE — ED PROVIDER NOTES
Subjective   46-year-old male presents to the emergency department after suffering laceration involving right middle finger, 30 minutes prior to arrival.  Bleeding controlled with pressure, although initially bleeding profusely.  Full range of motion intact.  No risk of foreign body, as it was a razor that cut him, while at work.  No other injuries or concerns to report.  He denies any significant pain, although states laceration is uncomfortable.  Sensation intact distally.  Neurovascularly intact.      History provided by:  Patient   used: No        Review of Systems   Constitutional: Negative for chills and fever.   HENT: Negative for congestion, ear pain and sore throat.    Eyes: Negative for pain.   Respiratory: Negative for cough, chest tightness and shortness of breath.    Cardiovascular: Negative for chest pain.   Gastrointestinal: Negative for abdominal pain, diarrhea, nausea and vomiting.   Genitourinary: Negative for flank pain and hematuria.   Musculoskeletal: Negative for joint swelling.   Skin: Positive for wound. Negative for pallor.   Neurological: Negative for seizures and headaches.   All other systems reviewed and are negative.      Past Medical History:   Diagnosis Date   • Anxiety    • Class 3 drug-induced obesity without serious comorbidity with body mass index (BMI) of 40.0 to 44.9 in adult ([facility]) 7/16/2021   • Hypertension    • Recurrent major depressive disorder ([facility]) 7/16/2021   • Seasonal allergies        Allergies   Allergen Reactions   • Atorvastatin Myalgia       History reviewed. No pertinent surgical history.    Family History   Problem Relation Age of Onset   • Hypertension Maternal Grandmother        Social History     Socioeconomic History   • Marital status: Single   Tobacco Use   • Smoking status: Former Smoker   • Smokeless tobacco: Former User   Vaping Use   • Vaping Use: Never used   Substance and Sexual Activity   • Alcohol use: No   • Drug use: No   •  Sexual activity: Defer           Objective   Physical Exam  Vitals and nursing note reviewed.   Constitutional:       General: He is not in acute distress.     Appearance: Normal appearance. He is not toxic-appearing.   HENT:      Head: Normocephalic and atraumatic.      Mouth/Throat:      Mouth: Mucous membranes are moist.   Eyes:      General: No scleral icterus.  Cardiovascular:      Rate and Rhythm: Normal rate and regular rhythm.      Pulses: Normal pulses.      Heart sounds: Normal heart sounds.   Pulmonary:      Effort: Pulmonary effort is normal. No respiratory distress.      Breath sounds: Normal breath sounds.   Abdominal:      General: Abdomen is flat.      Palpations: Abdomen is soft.      Tenderness: There is no abdominal tenderness.   Musculoskeletal:         General: Normal range of motion.      Cervical back: Normal range of motion and neck supple.   Skin:     General: Skin is warm and dry.      Findings: Laceration present.      Comments: 1-1/2 cm laceration involving right middle finger.  Bleeding controlled.  Sensation intact.  Neurovascularly intact.  Tendon visualized, without involvement.  Range of motion intact.  X-ray negative for foreign body or fracture.   Neurological:      Mental Status: He is alert and oriented to person, place, and time. Mental status is at baseline.         Laceration Repair    Date/Time: 1/14/2022 11:27 PM  Performed by: Savita Fitch PA-C  Authorized by: Higinio Terry MD     Consent:     Consent obtained:  Verbal    Consent given by:  Patient    Risks discussed:  Infection, pain, retained foreign body, need for additional repair, poor cosmetic result, tendon damage, nerve damage, poor wound healing and vascular damage  Anesthesia (see MAR for exact dosages):     Anesthesia method:  Local infiltration    Local anesthetic:  Lidocaine 1% w/o epi  Laceration details:     Location:  Finger    Finger location:  R long finger    Length (cm):  1.5    Depth (mm):   1  Repair type:     Repair type:  Simple  Pre-procedure details:     Preparation:  Patient was prepped and draped in usual sterile fashion and imaging obtained to evaluate for foreign bodies  Exploration:     Hemostasis achieved with:  Direct pressure and tourniquet    Wound exploration: wound explored through full range of motion and entire depth of wound probed and visualized      Wound extent: areolar tissue violated and fascia violated      Wound extent: no foreign bodies/material noted, no muscle damage noted, no nerve damage noted, no underlying fracture noted and no vascular damage noted      Contaminated: yes    Treatment:     Area cleansed with:  Betadine and saline    Amount of cleaning:  Extensive    Irrigation volume:  200 cc    Irrigation method:  Syringe  Skin repair:     Repair method:  Sutures    Suture size:  4-0    Suture material:  Nylon    Suture technique:  Simple interrupted    Number of sutures:  4  Approximation:     Approximation:  Close  Post-procedure details:     Dressing:  Antibiotic ointment and non-adherent dressing    Patient tolerance of procedure:  Tolerated well, no immediate complications               ED Course                                                 MDM  Number of Diagnoses or Management Options  Laceration of right middle finger without foreign body without damage to nail, initial encounter  Diagnosis management comments: I have spoke with the patient and I have explained the patient´s condition, diagnoses and treatment plan based on the information available to me at this time. I have answered all questions and addressed any concerns. The patient has a good understanding of the patient´s diagnosis, condition, and treatment plan as can be expected at this point. The vital signs have been stable. The patient´s condition is stable and appropriate for discharge from the emergency department.      The patient will pursue further outpatient evaluation with the primary care  physician or other designated or consulting physician as outlined in the discharge instructions. They are agreeable to this plan of care and follow-up instructions have been explained in detail. The patient has received these instructions in written format and have expressed an understanding of the discharge instructions. The patient is aware that any significant change in condition or worsening of symptoms should prompt an immediate return to this or the closest emergency department or call to 911.       Amount and/or Complexity of Data Reviewed  Tests in the radiology section of CPT®: reviewed and ordered    Risk of Complications, Morbidity, and/or Mortality  Presenting problems: low  Diagnostic procedures: low  Management options: low        Final diagnoses:   Laceration of right middle finger without foreign body without damage to nail, initial encounter       ED Disposition  ED Disposition     ED Disposition Condition Comment    Discharge Stable           Stefanie Xiao DO  [address]  [address] [address] [phone]    Schedule an appointment as soon as possible for a visit   As needed, If symptoms worsen         Medication List      New Prescriptions    cephalexin 500 MG capsule  Commonly known as: KEFLEX  Take 1 capsule by mouth 2 (Two) Times a Day for 7 days.           Where to Get Your Medications      These medications were sent to [id number] - [place] - [address] AT [facility] [phone]  - [phone] FX  [address] [place] [address]    Phone: [phone]   · cephalexin 500 MG capsule          Savita Fitch PA-C  01/14/22 9266

## 2023-02-28 ENCOUNTER — OFFICE VISIT (OUTPATIENT)
Dept: SURGERY | Facility: CLINIC | Age: 48
End: 2023-02-28
Payer: COMMERCIAL

## 2023-02-28 ENCOUNTER — PREP FOR SURGERY (OUTPATIENT)
Dept: OTHER | Facility: HOSPITAL | Age: 48
End: 2023-02-28
Payer: COMMERCIAL

## 2023-02-28 VITALS — WEIGHT: 280 LBS | HEART RATE: 76 BPM | BODY MASS INDEX: 46.65 KG/M2 | HEIGHT: 65 IN

## 2023-02-28 DIAGNOSIS — Z12.11 SCREENING FOR MALIGNANT NEOPLASM OF COLON: Primary | ICD-10-CM

## 2023-02-28 PROCEDURE — S0285 CNSLT BEFORE SCREEN COLONOSC: HCPCS | Performed by: NURSE PRACTITIONER

## 2023-02-28 RX ORDER — SOD SULF/POT CHLORIDE/MAG SULF 1.479 G
24 TABLET ORAL ONCE
Qty: 24 TABLET | Refills: 0 | Status: SHIPPED | OUTPATIENT
Start: 2023-02-28 | End: 2023-02-28

## 2023-02-28 NOTE — PROGRESS NOTES
Chief Complaint: Colonoscopy (consult)    Subjective      Colonoscopy consultation       History of Present Illness  Dinh Chau is a 47 y.o. male presents to [facility] for colonoscopy consultation.    Patient presents today on referral from Dyana Day for colonoscopy consultation.  Patient denies any abdominal pain, change in bowel habit, or rectal bleeding.    Denies any family history of colorectal cancer    No previous colonoscopy.  Objective     Past Medical History:   Diagnosis Date   • Anxiety    • Class 3 drug-induced obesity without serious comorbidity with body mass index (BMI) of 40.0 to 44.9 in adult (HCC) 7/16/2021   • Hypertension    • Recurrent major depressive disorder ([facility]) 7/16/2021   • Seasonal allergies        History reviewed. No pertinent surgical history.    Outpatient Medications Marked as Taking for the 2/28/23 encounter (Office Visit) with Inocencia Reese, APRHEIDI   Medication Sig Dispense Refill   • busPIRone (BUSPAR) 10 MG tablet buspirone 10 mg oral tablet take 1 tablet (10 mg) by oral route 3 times per day for 90 days 4/16/2021  Active change to 30 days if not covered     • FLUoxetine (PROzac) 20 MG capsule 1 CAPSULE ORALLY DAILY     • hydrOXYzine pamoate (VISTARIL) 25 MG capsule TAKE 1-2 CAPSULE BY MOUTH EVERY 8 HOURS AS NEEDED     • lisinopril-hydrochlorothiazide (PRINZIDE,ZESTORETIC) 20-12.5 MG per tablet lisinopril-hydrochlorothiazide 20-12.5 mg oral tablet take 1 tablet by oral route once daily for 90 days 4/16/2021  Active change to 30 days if not covered     • Multiple Vitamin (ONE-A-DAY MENS PO) Take  by mouth.     • Omega-3 1000 MG capsule TK 1 C PO QD  0   • pravastatin (PRAVACHOL) 20 MG tablet pravastatin 20 mg oral tablet take 1 tablet (20 mg) by oral route once daily for 90 days 4/16/2021  Active change to 30 days if not covered     • sildenafil (REVATIO) 20 MG tablet TAKE 1-5 TABLET BY MOUTH 30 MINUTES PRIOR TO SEXUAL  "ACTIVITY AS NEEDED - FOR ERECTION         Allergies   Allergen Reactions   • Atorvastatin Myalgia        Family History   Problem Relation Age of Onset   • Hypertension Maternal Grandmother        Social History     Socioeconomic History   • Marital status: Single   Tobacco Use   • Smoking status: Former   • Smokeless tobacco: Former   Vaping Use   • Vaping Use: Never used   Substance and Sexual Activity   • Alcohol use: No   • Drug use: No   • Sexual activity: Defer       Review of Systems   Constitutional: Negative for chills and fever.   Gastrointestinal: Negative for abdominal distention, abdominal pain, anal bleeding, blood in stool, constipation, diarrhea and rectal pain.        Vital Signs:   Pulse 76   Ht 165.1 cm (65\")   Wt 127 kg (280 lb)   BMI 46.59 kg/m²      Physical Exam  Vitals and nursing note reviewed.   Constitutional:       General: He is not in acute distress.     Appearance: He is obese.   HENT:      Head: Normocephalic.   Cardiovascular:      Rate and Rhythm: Normal rate.   Pulmonary:      Effort: Pulmonary effort is normal.      Breath sounds: No stridor.   Abdominal:      Palpations: Abdomen is soft.      Tenderness: There is no guarding.   Musculoskeletal:         General: No deformity. Normal range of motion.   Skin:     General: Skin is warm and dry.      Coloration: Skin is not jaundiced.   Neurological:      General: No focal deficit present.      Mental Status: He is alert and oriented to person, place, and time.   Psychiatric:         Mood and Affect: Mood normal.         Thought Content: Thought content normal.          Result Review :          [x]  Laboratory  []  Radiology  []  Pathology  []  Microbiology  []  EKG/Telemetry   []  Cardiology/Vascular   [x]  Old records  I spent 25 minutes caring for Dinh on this date of service. This time includes time spent by me in the following activities: reviewing tests, obtaining and/or reviewing a separately obtained history, " performing a medically appropriate examination and/or evaluation, ordering medications, tests, or procedures, and documenting information in the medical record.     Assessment and Plan    Diagnoses and all orders for this visit:    1. Screening for malignant neoplasm of colon (Primary)    Other orders  -     Sodium Sulfate-Mag Sulfate-KCl (Sutab) [id number] MG tablet; Take 24 tablets by mouth 1 (One) Time for 1 dose. Take as directed. Directions given in office  Dispense: 24 tablet; Refill: 0        Follow Up   Return for Schedule colonoscopy with Dr. Mckenna on 7/3/2023 at [facility].     Phone PAT.  Hospital call with arrival time.    Possible risks/complications, benefits, and alternatives to surgical or invasive procedure have been explained to patient and/or legal guardian.     Patient has been evaluated and can tolerate anesthesia and/or sedation. Risks, benefits, and alternatives to anesthesia and sedation have been explained to patient and/or legal guardian.  Patient verbalizes understanding and is willing to proceed with above plan.    Patient was given instructions and counseling regarding his condition or for health maintenance advice. Please see specific information pulled into the AVS if appropriate.

## 2023-06-07 ENCOUNTER — TELEPHONE (OUTPATIENT)
Dept: SURGERY | Facility: CLINIC | Age: 48
End: 2023-06-07
Payer: COMMERCIAL

## 2023-06-07 NOTE — TELEPHONE ENCOUNTER
Pt was prescribed Sutab but his insurance does not cover it. He wants a different bowel prep please.

## 2023-06-08 NOTE — TELEPHONE ENCOUNTER
Pt is aware that I will call his pharmacy to give a discount card for the Sutab. Pt is aware that it maybe tomorrow before I call Pt V/U.

## 2023-08-18 ENCOUNTER — TRANSCRIBE ORDERS (OUTPATIENT)
Dept: ADMINISTRATIVE | Facility: HOSPITAL | Age: 48
End: 2023-08-18
Payer: COMMERCIAL

## 2023-08-18 DIAGNOSIS — M76.62 ACHILLES TENDINITIS OF LEFT LOWER EXTREMITY: Primary | ICD-10-CM

## 2023-08-18 DIAGNOSIS — S86.012D STRAIN OF ACHILLES TENDON, LEFT, SUBSEQUENT ENCOUNTER: ICD-10-CM

## 2023-08-29 ENCOUNTER — OFFICE VISIT (OUTPATIENT)
Dept: ORTHOPEDIC SURGERY | Facility: CLINIC | Age: 48
End: 2023-08-29
Payer: COMMERCIAL

## 2023-08-29 VITALS
SYSTOLIC BLOOD PRESSURE: 148 MMHG | HEART RATE: 78 BPM | WEIGHT: 289 LBS | OXYGEN SATURATION: 96 % | DIASTOLIC BLOOD PRESSURE: 85 MMHG | HEIGHT: 66 IN | BODY MASS INDEX: 46.45 KG/M2

## 2023-08-29 DIAGNOSIS — M65.311 TRIGGER THUMB OF RIGHT HAND: ICD-10-CM

## 2023-08-29 DIAGNOSIS — M79.641 RIGHT HAND PAIN: Primary | ICD-10-CM

## 2023-08-29 RX ADMIN — LIDOCAINE HYDROCHLORIDE 1 ML: 10 INJECTION, SOLUTION INFILTRATION; PERINEURAL at 09:43

## 2023-08-29 RX ADMIN — TRIAMCINOLONE ACETONIDE 40 MG: 40 INJECTION, SUSPENSION INTRA-ARTICULAR; INTRAMUSCULAR at 09:43

## 2023-08-29 NOTE — PROGRESS NOTES
"Chief Complaint  Initial Evaluation of the Right Hand     Subjective      Dinh Chau presents to [facility] for evaluation of the right hand. The patient reports thumb pain. He denies catching and locking. He reports tenderness to the hand. He reports a tightness.     Allergies   Allergen Reactions    Atorvastatin Myalgia        Social History     Socioeconomic History    Marital status: Single   Tobacco Use    Smoking status: Former    Smokeless tobacco: Former   Vaping Use    Vaping Use: Never used   Substance and Sexual Activity    Alcohol use: No    Drug use: No    Sexual activity: Defer        I reviewed the patient's chief complaint, history of present illness, review of systems, past medical history, surgical history, family history, social history, medications, and allergy list.     Review of Systems     Constitutional: Denies fevers, chills, weight loss  Cardiovascular: Denies chest pain, shortness of breath  Skin: Denies rashes, acute skin changes  Neurologic: Denies headache, loss of consciousness  MSK: Right hand pain      Vital Signs:   /85 Comment: Pt aware of BP and advised to contact his PCP  Pulse 78   Ht 167.6 cm (66\")   Wt 131 kg (289 lb)   SpO2 96%   BMI 46.65 kg/mý          Physical Exam  General: Alert. No acute distress    Ortho Exam      Right hand- tender to the A-1 pulley. Neurovascularly intact. Sensation to light touch median, radial, ulnar nerve. Positive AIN, PIN, ulnar nerve motor function. No triggering today.     Small Joint Arthrocentesis: R thumb CMC  Consent given by: patient  Site marked: site marked  Timeout: Immediately prior to procedure a time out was called to verify the correct patient, procedure, equipment, support staff and site/side marked as required   Supporting Documentation  Indications: pain   Procedure Details  Location: thumb - R thumb CMC  Preparation: Patient was prepped and draped in the usual sterile " fashion  Needle gauge: 23g.  Medications administered: 40 mg triamcinolone acetonide 40 MG/ML; 1 mL lidocaine 1 %  Patient tolerance: patient tolerated the procedure well with no immediate complications      X-Ray Report:  Right hand  X-Ray  Indication: Evaluation of right hand pain  AP/Lateral view(s)  Findings: no acute fracture.   Prior studies available for comparison: no       Imaging Results (Most Recent)       Procedure Component Value Units Date/Time    XR Hand 2 View Right [[id number]] Resulted: 08/29/23 0850     Updated: 08/29/23 0854             Result Review :       No results found.           Assessment and Plan     Diagnoses and all orders for this visit:    1. Right hand pain (Primary)  -     XR Hand 2 View Right    2. Trigger thumb of right hand        Discussed the treatment plan with the patient.  I reviewed the x-rays that were obtained today with the patient. Discussed the risks and benefits of a Right trigger thumb injection. The patient expressed understanding and wished to proceed. He tolerated the injection well.     Call or return if worsening symptoms.    Follow Up     6 weeks      Patient was given instructions and counseling regarding his condition or for health maintenance advice. Please see specific information pulled into the AVS if appropriate.     Scribed for Jimbo Dhillon MD by Dyana Rodriguez.  08/29/23   08:52 EDT    I have personally performed the services described in this document as scribed by the above individual and it is both accurate and complete. Jimbo Dhillon MD 08/30/23

## 2023-08-30 RX ORDER — TRIAMCINOLONE ACETONIDE 40 MG/ML
40 INJECTION, SUSPENSION INTRA-ARTICULAR; INTRAMUSCULAR
Status: COMPLETED | OUTPATIENT
Start: 2023-08-29 | End: 2023-08-29

## 2023-08-30 RX ORDER — LIDOCAINE HYDROCHLORIDE 10 MG/ML
1 INJECTION, SOLUTION INFILTRATION; PERINEURAL
Status: COMPLETED | OUTPATIENT
Start: 2023-08-29 | End: 2023-08-29

## 2025-02-06 ENCOUNTER — OFFICE VISIT (OUTPATIENT)
Dept: ORTHOPEDIC SURGERY | Facility: CLINIC | Age: 50
End: 2025-02-06
Payer: COMMERCIAL

## 2025-02-06 VITALS
OXYGEN SATURATION: 95 % | HEIGHT: 66 IN | BODY MASS INDEX: 46.45 KG/M2 | DIASTOLIC BLOOD PRESSURE: 91 MMHG | HEART RATE: 78 BPM | SYSTOLIC BLOOD PRESSURE: 143 MMHG | WEIGHT: 289 LBS

## 2025-02-06 DIAGNOSIS — M79.641 RIGHT HAND PAIN: Primary | ICD-10-CM

## 2025-02-06 DIAGNOSIS — M65.321 TRIGGER INDEX FINGER OF RIGHT HAND: ICD-10-CM

## 2025-02-06 RX ORDER — TRIAMCINOLONE ACETONIDE 40 MG/ML
40 INJECTION, SUSPENSION INTRA-ARTICULAR; INTRAMUSCULAR
Status: COMPLETED | OUTPATIENT
Start: 2025-02-06 | End: 2025-02-06

## 2025-02-06 RX ORDER — LIDOCAINE HYDROCHLORIDE 10 MG/ML
1 INJECTION, SOLUTION INFILTRATION; PERINEURAL
Status: COMPLETED | OUTPATIENT
Start: 2025-02-06 | End: 2025-02-06

## 2025-02-06 RX ADMIN — TRIAMCINOLONE ACETONIDE 40 MG: 40 INJECTION, SUSPENSION INTRA-ARTICULAR; INTRAMUSCULAR at 08:15

## 2025-02-06 RX ADMIN — LIDOCAINE HYDROCHLORIDE 1 ML: 10 INJECTION, SOLUTION INFILTRATION; PERINEURAL at 08:15

## 2025-02-06 NOTE — PROGRESS NOTES
"Chief Complaint  Initial Evaluation of the Right Hand         Subjective      Dinh Chau presents to [facility] for an evaluation  of his right hand. His right hand has been bothering him for several months. He has locking, catching and trigger to his right index finger. He has stiffness with flexion  and denies any specific injury, trauma, falls or prior surgery.     Allergies   Allergen Reactions    Atorvastatin Myalgia        Social History     Socioeconomic History    Marital status: Single   Tobacco Use    Smoking status: Former    Smokeless tobacco: Former   Vaping Use    Vaping status: Never Used   Substance and Sexual Activity    Alcohol use: No    Drug use: No    Sexual activity: Defer        I reviewed the patient's chief complaint, history of present illness, review of systems, past medical history, surgical history, family history, social history, medications, and allergy list.     Review of Systems     Constitutional: Denies fevers, chills, weight loss  Cardiovascular: Denies chest pain, shortness of breath  Skin: Denies rashes, acute skin changes  Neurologic: Denies headache, loss of consciousness  MSK: right hand pain       Vital Signs:   /91   Pulse 78   Ht 167.6 cm (65.98\")   Wt 131 kg (289 lb)   SpO2 95%   BMI 46.67 kg/m²            Ortho Exam    Physical Exam  General:Alert. No acute distress     Right upper extremity: triggering, locking and catching to his right index finger, tender to the A1 pulley to the right index finger, negative  grind test, non tender to the CMC, negative  finkelstein, negative  compression and Tinel's, full extension, stiffness with flexion, neurovascularly intact, sensation intact to the medial, radial and ulnar nerve, positive  pulses.    Small Joint  Consent given by: patient  Site marked: site marked  Timeout: Immediately prior to procedure a time out was called to verify the correct patient, procedure, equipment, " support staff and site/side marked as required   Procedure Details  Location: index finger - Index finger joint: right.  Preparation: Patient was prepped and draped in the usual sterile fashion  Medications administered: 1 mL lidocaine 1 %; 40 mg triamcinolone acetonide 40 MG/ML  Patient tolerance: patient tolerated the procedure well with no immediate complications    This injection documentation was Scribed for Jimbo Dhillon MD by Natalie Rodriguez MA.  02/06/25   08:39 EST   Imaging Results (Most Recent)       None             Result Review :       No results found.           Assessment and Plan     Diagnoses and all orders for this visit:    1. Right hand pain (Primary)  -     Cancel: XR Hand 2 View Right    2. Trigger index finger of right hand        The patient presents here today for an evaluation  of his right hand.     Discussed the risks and benefits of a right index trigger finger injection today in the office. Patient expressed understanding and wishes to proceed. Patient tolerted the injection well and without any complications.     Home exercises given today and will continue current medications for pain control.     Call or return if worsening symptoms.    Follow Up     PRN     Patient was given instructions and counseling regarding his condition or for health maintenance advice. Please see specific information pulled into the AVS if appropriate.     Scribed for Jimbo Dhillon MD by Lanny Farah.  02/06/25   08:19 EST    I have personally performed the services described in this document as scribed by the above individual and it is both accurate and complete. Jimbo Dhillon MD 02/06/25